# Patient Record
Sex: FEMALE | Race: WHITE | Employment: UNEMPLOYED | URBAN - METROPOLITAN AREA
[De-identification: names, ages, dates, MRNs, and addresses within clinical notes are randomized per-mention and may not be internally consistent; named-entity substitution may affect disease eponyms.]

---

## 2017-01-16 ENCOUNTER — HOSPITAL ENCOUNTER (OUTPATIENT)
Dept: CT IMAGING | Age: 72
Discharge: HOME | End: 2017-01-16
Attending: INTERNAL MEDICINE | Admitting: INTERNAL MEDICINE

## 2017-01-16 DIAGNOSIS — R51.9 HEADACHE: ICD-10-CM

## 2017-01-16 DIAGNOSIS — R51.9 ACUTE NONINTRACTABLE HEADACHE, UNSPECIFIED HEADACHE TYPE: ICD-10-CM

## 2017-01-24 ENCOUNTER — OFFICE VISIT (OUTPATIENT)
Dept: CARDIOLOGY CLINIC | Age: 72
End: 2017-01-24

## 2017-01-24 VITALS
SYSTOLIC BLOOD PRESSURE: 178 MMHG | WEIGHT: 118 LBS | BODY MASS INDEX: 19.66 KG/M2 | HEART RATE: 70 BPM | DIASTOLIC BLOOD PRESSURE: 80 MMHG | HEIGHT: 65 IN

## 2017-01-24 DIAGNOSIS — I10 ESSENTIAL HYPERTENSION: ICD-10-CM

## 2017-01-24 DIAGNOSIS — I05.9 MITRAL VALVE DISEASE: Primary | ICD-10-CM

## 2017-01-24 DIAGNOSIS — I34.0 NON-RHEUMATIC MITRAL REGURGITATION: ICD-10-CM

## 2017-01-24 PROCEDURE — 99214 OFFICE O/P EST MOD 30 MIN: CPT | Performed by: INTERNAL MEDICINE

## 2017-05-23 ENCOUNTER — HOSPITAL ENCOUNTER (OUTPATIENT)
Dept: NON INVASIVE DIAGNOSTICS | Age: 72
Discharge: HOME | End: 2017-05-23
Attending: INTERNAL MEDICINE | Admitting: INTERNAL MEDICINE

## 2017-05-23 ENCOUNTER — OFFICE VISIT (OUTPATIENT)
Dept: CARDIOLOGY CLINIC | Age: 72
End: 2017-05-23

## 2017-05-23 VITALS
HEART RATE: 66 BPM | WEIGHT: 113.1 LBS | BODY MASS INDEX: 18.82 KG/M2 | SYSTOLIC BLOOD PRESSURE: 144 MMHG | DIASTOLIC BLOOD PRESSURE: 80 MMHG

## 2017-05-23 DIAGNOSIS — I05.9 RHEUMATIC MITRAL VALVE DISEASE: ICD-10-CM

## 2017-05-23 DIAGNOSIS — I63.9 CEREBROVASCULAR ACCIDENT (CVA), UNSPECIFIED MECHANISM (HCC): ICD-10-CM

## 2017-05-23 DIAGNOSIS — I34.0 NON-RHEUMATIC MITRAL REGURGITATION: Chronic | ICD-10-CM

## 2017-05-23 DIAGNOSIS — I10 ESSENTIAL HYPERTENSION: Primary | Chronic | ICD-10-CM

## 2017-05-23 LAB
LV EF: 60 %
LVEF MODALITY: NORMAL

## 2017-05-23 PROCEDURE — 99213 OFFICE O/P EST LOW 20 MIN: CPT | Performed by: INTERNAL MEDICINE

## 2017-05-23 RX ORDER — LOSARTAN POTASSIUM 100 MG/1
100 TABLET ORAL DAILY
Qty: 90 TABLET | Refills: 3 | Status: SHIPPED | OUTPATIENT
Start: 2017-05-23 | End: 2017-11-28 | Stop reason: SDUPTHER

## 2017-05-23 RX ORDER — CARVEDILOL 25 MG/1
25 TABLET ORAL 2 TIMES DAILY WITH MEALS
Qty: 180 TABLET | Refills: 3 | Status: SHIPPED | OUTPATIENT
Start: 2017-05-23 | End: 2017-11-28 | Stop reason: SDUPTHER

## 2017-05-23 RX ORDER — HYDROCHLOROTHIAZIDE 25 MG/1
25 TABLET ORAL DAILY
Qty: 90 TABLET | Refills: 3 | Status: SHIPPED | OUTPATIENT
Start: 2017-05-23 | End: 2017-11-28 | Stop reason: SDUPTHER

## 2017-05-23 RX ORDER — ATORVASTATIN CALCIUM 10 MG/1
10 TABLET, FILM COATED ORAL DAILY
Qty: 90 TABLET | Refills: 3 | Status: SHIPPED | OUTPATIENT
Start: 2017-05-23 | End: 2017-11-28 | Stop reason: SDUPTHER

## 2017-07-24 RX ORDER — CARVEDILOL 25 MG/1
TABLET ORAL
Qty: 180 TABLET | Refills: 0 | OUTPATIENT
Start: 2017-07-24

## 2017-11-28 ENCOUNTER — OFFICE VISIT (OUTPATIENT)
Dept: CARDIOLOGY CLINIC | Age: 72
End: 2017-11-28

## 2017-11-28 VITALS
WEIGHT: 110.5 LBS | OXYGEN SATURATION: 96 % | BODY MASS INDEX: 18.41 KG/M2 | HEIGHT: 65 IN | SYSTOLIC BLOOD PRESSURE: 172 MMHG | DIASTOLIC BLOOD PRESSURE: 92 MMHG | HEART RATE: 69 BPM

## 2017-11-28 DIAGNOSIS — I10 ESSENTIAL HYPERTENSION: Primary | Chronic | ICD-10-CM

## 2017-11-28 DIAGNOSIS — I63.9 CEREBROVASCULAR ACCIDENT (CVA), UNSPECIFIED MECHANISM (HCC): ICD-10-CM

## 2017-11-28 DIAGNOSIS — I34.0 NON-RHEUMATIC MITRAL REGURGITATION: Chronic | ICD-10-CM

## 2017-11-28 PROCEDURE — 99214 OFFICE O/P EST MOD 30 MIN: CPT | Performed by: INTERNAL MEDICINE

## 2017-11-28 RX ORDER — LOSARTAN POTASSIUM 100 MG/1
100 TABLET ORAL DAILY
Qty: 90 TABLET | Refills: 3 | Status: SHIPPED | OUTPATIENT
Start: 2017-11-28

## 2017-11-28 RX ORDER — HYDROCHLOROTHIAZIDE 25 MG/1
25 TABLET ORAL DAILY
Qty: 90 TABLET | Refills: 3 | Status: SHIPPED | OUTPATIENT
Start: 2017-11-28 | End: 2018-07-06 | Stop reason: SDUPTHER

## 2017-11-28 RX ORDER — CARVEDILOL 25 MG/1
25 TABLET ORAL 2 TIMES DAILY WITH MEALS
Qty: 180 TABLET | Refills: 3 | Status: SHIPPED | OUTPATIENT
Start: 2017-11-28

## 2017-11-28 RX ORDER — ATORVASTATIN CALCIUM 10 MG/1
10 TABLET, FILM COATED ORAL DAILY
Qty: 90 TABLET | Refills: 3 | Status: SHIPPED | OUTPATIENT
Start: 2017-11-28 | End: 2018-04-27 | Stop reason: SDUPTHER

## 2017-11-28 NOTE — PATIENT INSTRUCTIONS
No medication changes  Call if any new symptoms, especially SOB, swellling  Follow up in May with echocardiogram

## 2017-11-28 NOTE — PROGRESS NOTES
[address]  Cardiac Consult     Referring Provider:  Sommer Brennan MD     Chief Complaint   Patient presents with    Hypertension     no cardiac complaints at this time    Other     MR, stroke    6 Month Follow-Up      History of Present Illness:  Mrs. Ashly Retana is here in follow up for hypertension and MRStephen She had a CVA in past,documented on MRI, no residual.     She is doing well. Her  was just diagnosed with neuroblastoma multiforme. She remains active without chest pain or dyspnea. Past Medical History:   has a past medical history of Cancer ([facility] [address] Chronic low back pain; Hypertension; and Stroke (cerebrum) ([facility] [address] H/O 95 breast cancer L breast , 8/2013 R lower inflammatory mass and removal     Surgical History:   has a past surgical history that includes Breast surgery; Hysterectomy; laparoscopy (8/30/13); Salpingo-oophorectomy (Right, 8/30/13); Ovary removal (Right, 2013); and Mandible surgery (Bilateral, 1986). Social History:   reports that she has never smoked. She has never used smokeless tobacco. She reports that she does not drink alcohol or use drugs. Family History:  family history includes Cancer in her mother; High Blood Pressure in her mother; Other (age of onset: 46) in her father. Home Medications:  Outpatient Encounter Prescriptions as of 11/28/2017   Medication Sig Dispense Refill    atorvastatin (LIPITOR) 10 MG tablet Take 1 tablet by mouth daily 90 tablet 3    carvedilol (COREG) 25 MG tablet Take 1 tablet by mouth 2 times daily (with meals) 180 tablet 3    losartan (COZAAR) 100 MG tablet Take 1 tablet by mouth daily 90 tablet 3    hydrochlorothiazide (HYDRODIURIL) 25 MG tablet Take 1 tablet by mouth daily 90 tablet 3    aspirin 325 MG tablet Take 325 mg by mouth daily      HYDROcodone-acetaminophen (NORCO) 5-325 MG per tablet Take 1 tablet by mouth nightly as needed for Pain.  30 tablet 0     No facility-administered encounter medications on file as of 11/28/2017. Allergies:  Sulfa antibiotics     · ROS:  [x]Full ROS obtained and negative except as mentioned in HPI      Physical Examination:    Vitals:    11/28/17 1348   BP: (!) 172/92   Pulse:    SpO2:         · GENERAL: Well developed, well nourished, No acute distress  · NEUROLOGICAL: Alert and oriented  · PSYCH: anxious affect  · SKIN: Warm and dry  · HEENT: Normocephalic, Sclera non-icteric, mucus membranes moist  · NECK: supple, JVP normal  · CAROTID: Normal upstroke, no bruits  · CARDIAC: Normal PMI, regular rate and rhythm, normal E1S3,  Feint systolic murmur  · RESPIRATORY: Normal respiratory effort, clear to auscultation bilaterally  · EXTREMITIES: No edema  · MUSCULOSKELETAL: No joint swelling or tenderness, no chest wall tenderness  · GASTROINTESTINAL: normal bowel sounds, soft, non-tender, no bruit    ECHO (personally reviewed) 5/2017   -Normal left ventricle size, wall thickness and systolic function with an   estimated ejection fraction of 60%.  -The mitral leaflets are thickened. Mitral regurgitation is present and is   at least moderate with anteriorly and posteriorly directed jets.   -Trivial aortic regurgitation is present.   -There is mild tricuspid regurgitation with RVSP estimated at 38 mmHg. Assessment:   1. Hypertension: BP (!) 172/92 (Site: Left Arm, Position: Sitting, Cuff Size: Medium Adult)   Pulse 69   Ht 5' 5\" (1.651 m)   Wt 110 lb 8 oz (50.1 kg)   SpO2 96%   BMI 18.39 kg/m²   High today, controlled at home. 2.  Stroke:   5/26/15  stable, MRI showed sm- mod infarct -on apirin 325 mg daily-severe headaches. 3.   MR:   Stable. Remains asymptomatic  -JACKI 4/13/16> normal LV, mild to moderate MR  -TTE 6/9/2015> Was suggestive of severe MR moderate TR RVSP 49 mmHg mild MR  -JACKI 6/25/15> Mild MR normal LV, nonobstructive CAD and a negative bubble study -    -TTE 10/26/15> Severe MR --symptom of shortness of breath.    The severity of the MR is variable on JACKI and TTE.  -Stress echo 4/7/16> EF 55-60%, mod. MR, mild TR, did not reach THR  ECHO 5/2017_ Nl LV, eccentric MR, moderate    Plan:   Stable cardiac status  F/u 6 months with ECHO for MR.     Thank you for allowing me to participate in the care of this individual.      Debora Mclean M.D., [facility] [place]

## 2017-11-28 NOTE — LETTER
[facility]  [address]  [address]  [facility] [address]  Phone: [phone]  Fax: [phone]    Ernie Brooke MD        November 28, 2017     Pat Pryor MD  [address] [facility] [address]    Patient: Brandy Bass  MR Number: [id number]  YOB: 1945  Date of Visit: 11/28/2017    Dear Dr. Stephens [place]     Referring Provider:  Pat Pryor MD     Chief Complaint   Patient presents with    Hypertension     no cardiac complaints at this time    Other     MR, stroke    6 Month Follow-Up      History of Present Illness:  Mrs. Taylor Delgado is here in follow up for hypertension and MRStephen She had a CVA in past,documented on MRI, no residual.     She is doing well. Her  was just diagnosed with neuroblastoma multiforme. She remains active without chest pain or dyspnea. Past Medical History:   has a past medical history of Cancer ([address] Chronic low back pain; Hypertension; and Stroke (cerebrum) ([facility] [address] H/O 95 breast cancer L breast , 8/2013 R lower inflammatory mass and removal     Surgical History:   has a past surgical history that includes Breast surgery; Hysterectomy; laparoscopy (8/30/13); Salpingo-oophorectomy (Right, 8/30/13); Ovary removal (Right, 2013); and Mandible surgery (Bilateral, 1986). Social History:   reports that she has never smoked. She has never used smokeless tobacco. She reports that she does not drink alcohol or use drugs. Family History:  family history includes Cancer in her mother; High Blood Pressure in her mother; Other (age of onset: 46) in her father.      Home Medications:  Outpatient Encounter Prescriptions as of 11/28/2017   Medication Sig Dispense Refill    atorvastatin (LIPITOR) 10 MG tablet Take 1 tablet by mouth daily 90 tablet 3    carvedilol (COREG) 25 MG tablet Take 1 tablet by mouth 2 times daily (with meals) 180 tablet 3  losartan (COZAAR) 100 MG tablet Take 1 tablet by mouth daily 90 tablet 3    hydrochlorothiazide (HYDRODIURIL) 25 MG tablet Take 1 tablet by mouth daily 90 tablet 3    aspirin 325 MG tablet Take 325 mg by mouth daily      HYDROcodone-acetaminophen (NORCO) 5-325 MG per tablet Take 1 tablet by mouth nightly as needed for Pain. 30 tablet 0     No facility-administered encounter medications on file as of 11/28/2017. Allergies:  Sulfa antibiotics     · ROS:  Full ROS obtained and negative except as mentioned in HPI      Physical Examination:    Vitals:    11/28/17 1348   BP: (!) 172/92   Pulse:    SpO2:         · GENERAL: Well developed, well nourished, No acute distress  · NEUROLOGICAL: Alert and oriented  · PSYCH: anxious affect  · SKIN: Warm and dry  · HEENT: Normocephalic, Sclera non-icteric, mucus membranes moist  · NECK: supple, JVP normal  · CAROTID: Normal upstroke, no bruits  · CARDIAC: Normal PMI, regular rate and rhythm, normal Z8U9,  Feint systolic murmur  · RESPIRATORY: Normal respiratory effort, clear to auscultation bilaterally  · EXTREMITIES: No edema  · MUSCULOSKELETAL: No joint swelling or tenderness, no chest wall tenderness  · GASTROINTESTINAL: normal bowel sounds, soft, non-tender, no bruit    ECHO (personally reviewed) 5/2017   -Normal left ventricle size, wall thickness and systolic function with an   estimated ejection fraction of 60%.  -The mitral leaflets are thickened. Mitral regurgitation is present and is   at least moderate with anteriorly and posteriorly directed jets.   -Trivial aortic regurgitation is present.   -There is mild tricuspid regurgitation with RVSP estimated at 38 mmHg. Assessment:   1. Hypertension: BP (!) 172/92 (Site: Left Arm, Position: Sitting, Cuff Size: Medium Adult)   Pulse 69   Ht 5' 5\" (1.651 m)   Wt 110 lb 8 oz (50.1 kg)   SpO2 96%   BMI 18.39 kg/m²    High today, controlled at home. 2.  Stroke:   5/26/15  stable, MRI showed sm- mod infarct -on apirin 325 mg daily-severe headaches. 3.   MR:   Stable. Remains asymptomatic  -JACKI 4/13/16> normal LV, mild to moderate MR  -TTE 6/9/2015> Was suggestive of severe MR moderate TR RVSP 49 mmHg mild MR  -JACKI 6/25/15> Mild MR normal LV, nonobstructive CAD and a negative bubble study -    -TTE 10/26/15> Severe MR --symptom of shortness of breath. The severity of the MR is variable on JACKI and TTE.  -Stress echo 4/7/16> EF 55-60%, mod. MR, mild TR, did not reach THR  ECHO 5/2017_ Nl LV, eccentric MR, moderate    Plan:   Stable cardiac status  F/u 6 months with ECHO for MR. Thank you for allowing me to participate in the care of this individual.      Inocente Stack M.D., [facility] [place]      If you have questions, please do not hesitate to call me. I look forward to following Thuy Bedoya along with you.     Sincerely,        Kelly Mosqueda MD

## 2017-11-28 NOTE — COMMUNICATION BODY
[address]  Cardiac Consult     Referring Provider:  Dangelo Man MD     Chief Complaint   Patient presents with    Hypertension     no cardiac complaints at this time    Other     MR, stroke    6 Month Follow-Up      History of Present Illness:  Mrs. Jesu Kay is here in follow up for hypertension and MRStephen She had a CVA in past,documented on MRI, no residual.     She is doing well. Her  was just diagnosed with neuroblastoma multiforme. She remains active without chest pain or dyspnea. Past Medical History:   has a past medical history of Cancer ([address] Chronic low back pain; Hypertension; and Stroke (cerebrum) ([facility] [address] H/O 95 breast cancer L breast , 8/2013 R lower inflammatory mass and removal     Surgical History:   has a past surgical history that includes Breast surgery; Hysterectomy; laparoscopy (8/30/13); Salpingo-oophorectomy (Right, 8/30/13); Ovary removal (Right, 2013); and Mandible surgery (Bilateral, 1986). Social History:   reports that she has never smoked. She has never used smokeless tobacco. She reports that she does not drink alcohol or use drugs. Family History:  family history includes Cancer in her mother; High Blood Pressure in her mother; Other (age of onset: 46) in her father. Home Medications:  Outpatient Encounter Prescriptions as of 11/28/2017   Medication Sig Dispense Refill    atorvastatin (LIPITOR) 10 MG tablet Take 1 tablet by mouth daily 90 tablet 3    carvedilol (COREG) 25 MG tablet Take 1 tablet by mouth 2 times daily (with meals) 180 tablet 3    losartan (COZAAR) 100 MG tablet Take 1 tablet by mouth daily 90 tablet 3    hydrochlorothiazide (HYDRODIURIL) 25 MG tablet Take 1 tablet by mouth daily 90 tablet 3    aspirin 325 MG tablet Take 325 mg by mouth daily      HYDROcodone-acetaminophen (NORCO) 5-325 MG per tablet Take 1 tablet by mouth nightly as needed for Pain.  30 tablet 0     No facility-administered encounter medications on file as TTE.  -Stress echo 4/7/16> EF 55-60%, mod. MR, mild TR, did not reach THR  ECHO 5/2017_ Nl LV, eccentric MR, moderate    Plan:   Stable cardiac status  F/u 6 months with ECHO for MR.     Thank you for allowing me to participate in the care of this individual.      Chris Triplett M.D., [facility] [place]

## 2018-04-27 DIAGNOSIS — I05.9 MITRAL VALVE DISEASE: ICD-10-CM

## 2018-04-27 DIAGNOSIS — I10 ESSENTIAL HYPERTENSION: Primary | Chronic | ICD-10-CM

## 2018-04-27 RX ORDER — ATORVASTATIN CALCIUM 10 MG/1
10 TABLET, FILM COATED ORAL DAILY
Qty: 90 TABLET | Refills: 3 | Status: SHIPPED | OUTPATIENT
Start: 2018-04-27

## 2018-05-15 RX ORDER — LOSARTAN POTASSIUM 100 MG/1
100 TABLET ORAL DAILY
Qty: 90 TABLET | Refills: 1 | Status: SHIPPED | OUTPATIENT
Start: 2018-05-15

## 2018-07-06 RX ORDER — HYDROCHLOROTHIAZIDE 25 MG/1
25 TABLET ORAL DAILY
Qty: 90 TABLET | Refills: 3 | Status: SHIPPED | OUTPATIENT
Start: 2018-07-06